# Patient Record
Sex: MALE | Race: WHITE
[De-identification: names, ages, dates, MRNs, and addresses within clinical notes are randomized per-mention and may not be internally consistent; named-entity substitution may affect disease eponyms.]

---

## 2017-01-15 ENCOUNTER — HOSPITAL ENCOUNTER (EMERGENCY)
Dept: HOSPITAL 53 - M ED | Age: 47
Discharge: HOME | End: 2017-01-15
Payer: COMMERCIAL

## 2017-01-15 DIAGNOSIS — Z79.899: ICD-10-CM
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
## 2017-01-15 DIAGNOSIS — S61.511A: Primary | ICD-10-CM
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
## 2017-01-15 NOTE — EDDOCDS
Physician Documentation                                                                           

[facility]                                                                         

Name: Gerald Aaron                                                                              

Age: 46 yrs                                                                                       

Sex: Male                                                                                         

: 1970                                                                                   

MRN: [id number]                                                                                     

Arrival Date: 01/15/2017                                                                          

Time: 11:49                                                                                       

Account#: [id number]                                                                              

Bed PD                                                                                      

Private MD: Ulices Villa A.                                                                      

Disposition:                                                                                      

01/15/17 14:38 Discharged to Home/Self Care. Impression: Laceration without foreign body of       

right wrist.                                                                                    

- Condition is Stable.                                                                            

- Discharge Instructions: Wound Check.                                                            

- Prescriptions for naproxen 500 mg Oral tablet - take 1 tablet by ORAL route every 12            

hours; 28 tablet. Tylenol 325 mg Oral Tablet - take 2 tablet by ORAL route every 6              

hours as needed; 1 bottle.                                                                      

- Medication Reconciliation, Local Pharmacy Hours form.                                           

- Follow up: Emergency Department; When: As soon as possible; Reason: Worsening of                

conditions. Follow up: Private Physician; When: 2 - 3 days; Reason: Recheck today's             

complaints.                                                                                     

- Problem is new.                                                                                 

- Symptoms have improved.                                                                         

                                                                                                  

                                                                                                  

                                                                                                  

Historical:                                                                                       

- Allergies: no known allergies;                                                                  

- Home Meds:                                                                                      

1. Wellbutrin  mg Oral Tb24 1 tab once daily                                              

2. Motrin 800 mg Oral tab 1 tab 3 times per day                                                 

3. Ritalin 20 mg Oral tab daily                                                                 

- PSHx: Hernia repair; left knee surgery;                                                         

- Immunization history:: Last tetanus immunization: unknown.                                      

- Family history: Not pertinent.                                                                  

- Social history: Smoking status: Patient states was never smoker of tobacco. No                  

barriers to communication noted, The patient speaks fluent English.                             

- : The pt / caregiver states he / she is not on anticoagulants. Home medication list             

is obtained from the patient.                                                                   

- Exposure Risk Screening:: None identified.                                                      

                                                                                                  

                                                                                                  

Vital Signs:                                                                                      

01/15                                                                                             

11:53  / 112; Pulse 100; Resp 18; Temp 98.0; Pulse Ox 100% ; Weight 86.18 kg / 189.99   elp 

      lbs; Height 5 ft. 8 in. (172.72 cm); Pain 5/10;                                             

14:45  / 92 LA Sitting (auto/lg); Pulse 79; Resp 18; Temp 98.2(T); Pulse Ox 99% on R/A; ar3 

      Pain 4/10;                                                                                  

11:53 Body Mass Index 28.89 (86.18 kg, 172.72 cm)                                             elp 

                                                                                                  

MDM:                                                                                              

13:16 Wound Care ordered.                                                                     jk8 

13:34 Financial registration complete.                                                        mm15

14:13 Lidocaine 20 mg/mL (2 %) 10 ml Infiltration once; to bedside ordered.                   jk8 

14:14 Vital Signs ordered.                                                                    jk8 

14:19 [facility] Payment Agreement was scanned into [employer] and attached to record.               mm15

                                                                                                  

Administered Medications:                                                                         

14:30 Drug: Lidocaine 10 ml [lidocaine 20 mg/mL (2 %) injection solution (10 mL)] {Note:      mcp 

      given by JACKIE Rudolph} Route: Infiltration;                                              

                                                                                                  

                                                                                                  

Signatures:                                                                                       

Era Walton RN                        RN   Angie Galan                             mm15                                                 

Ollie Lau PA-C PA-C jk8                                                  

                                                                                                  

The chart was reviewed and I authenticate all verbal orders and agree with the evaluation and 
treatment provided.Attachments:

14:19 [facility] Payment Agreement                                                                mm15

                                                                                                  

**************************************************************************************************

MTDD

## 2017-01-15 NOTE — EDDOCDS
Nurse's Notes                                                                                     

[facility]                                                                         

Name: Gerald Aaorn                                                                              

Age: 46 yrs                                                                                       

Sex: Male                                                                                         

: 1970                                                                                   

MRN: [id number]                                                                                     

Arrival Date: 01/15/2017                                                                          

Time: 11:49                                                                                       

Account#: [id number]                                                                              

Bed PD                                                                                      

Private MD: Ulices Villa A.                                                                      

Diagnosis: Laceration without foreign body of right wrist                                         

                                                                                                  

Presentation:                                                                                     

01/15                                                                                             

11:57 Presenting complaint: Patient states: Cut right wrist with utility knife. Adult Sepsis  [facility] 

      Screening: The patient does not have new or worsening altered mentation. Patient's          

      respiratory rate is less than 22. Systolic blood pressure is greater than 100. Patient      

      has a qSOFA score of 0- Negative Sepsis Screen. Suicide/Homicide risk assessment- the       

      patient denies having any suicidal and/or homicidal ideations and does not present with     

      any other emotional, behavioral or mental health complaints.  Status: Patient       

      is not a  or  dependent. Transition of care: patient was not          

      received from another setting of care.                                                      

11:57 Acuity: PURVI Level 4                                                                     [facility] 

11:57 Method Of Arrival: Walkin/Carried/Asstd                                                 [facility] 

                                                                                                  

Triage Assessment:                                                                                

11:59 General: Appears in no apparent distress, Behavior is cooperative. Pain: Location:      mcp 

      right wrist Pain currently is 5 out of 10 on a pain scale. HIV screening NA for this        

      visit Offered previously. Neurological: No deficits noted. Respiratory: No deficits         

      noted. Derm: Skin is pink, warm & dry. Injury Description: Laceration sustained to        

      right wrist is gauze dressing intact.                                                       

                                                                                                  

Historical:                                                                                       

- Allergies: no known allergies;                                                                  

- Home Meds:                                                                                      

1. Wellbutrin  mg Oral Tb24 1 tab once daily                                              

2. Motrin 800 mg Oral tab 1 tab 3 times per day                                                 

3. Ritalin 20 mg Oral tab daily                                                                 

- PSHx: Hernia repair; left knee surgery;                                                         

- Immunization history:: Last tetanus immunization: unknown.                                      

- Family history: Not pertinent.                                                                  

- Social history: Smoking status: Patient states was never smoker of tobacco. No                  

barriers to communication noted, The patient speaks fluent English.                             

- : The pt / caregiver states he / she is not on anticoagulants. Home medication list             

is obtained from the patient.                                                                   

- Exposure Risk Screening:: None identified.                                                      

                                                                                                  

                                                                                                  

Screenin:49 Screening information is obtained from the patient. Fall risk: No risks identified.     mcp 

      Assistance ADL's: requires no assistance with activities of daily living. Abuse/DV          

      Screen: The patient / caregiver reports he/she is: not in a situation that causes fear,     

      pain or injury. Nutritional screening: No deficits noted. Advance Directives:               

      Currently, there is no health care proxy. There is no active DNR order. There is no         

      Power of . home support is adequate.                                                

                                                                                                  

Assessment:                                                                                       

14:48 General: Appears in no apparent distress, Behavior is cooperative. Neurological: No     mcp 

      deficits noted. Respiratory: Airway is patent Respiratory effort is even, unlabored.        

      Derm: Skin is pink, warm & dry. Injury Description: Laceration sustained to right wrist   

      is clean, 0.5 to 2.5 cm long, bleeding moderately.                                          

                                                                                                  

Vital Signs:                                                                                      

11:53  / 112; Pulse 100; Resp 18; Temp 98.0; Pulse Ox 100% ; Weight 86.18 kg; Height 5  elp 

      ft. 8 in. (172.72 cm); Pain 5/10;                                                           

14:45  / 92 LA Sitting (auto/lg); Pulse 79; Resp 18; Temp 98.2(T); Pulse Ox 99% on R/A; ar3 

      Pain 4/10;                                                                                  

11:53 Body Mass Index 28.89 (86.18 kg, 172.72 cm)                                             elp 

                                                                                                  

Vitals:                                                                                           

11:53 Log In Time: January 15, 2017 at 11:52.                                                 elp 

                                                                                                  

ED Course:                                                                                        

11:52 Patient visited by Mai Mccray PCA.                                                  elp 

11:52 Ulices Villa is Private Physician.                                                      elp 

11:52 Patient moved to Waiting                                                                elp 

11:53 Patient visited by Mai Mccray PCA.                                                  elp 

11:53 Patient moved to Pre RCE                                                                elp 

11:57 Triage Initiated                                                                        mcp 

12:00 Patient visited by Era Walton RN.                                                    mcp 

12:34 Patient moved to Triage 1                                                               ar3 

13:15 [facility]                                                          jk8 

13:15 Lars Gonzalez MD is Attending Physician.                                               jk8 

13:15 Patient visited by Ollie Lau PA-C.                                               jk8 

13:16 Patient moved to PD2 /                                                                ar3 

13:23 Patient visited by Era Walton RN.                                                    mcp 

13:23 Wound care to laceration located on right wrist was soaked in sterile water Patient     mcp 

      tolerated well.                                                                             

14:19 [facility] Payment Agreement was scanned into [employer] and attached to record.               mm15

14:46 Patient visited by Kimberley Castro PCA.                                                  ar3 

14:49 The patient / caregiver is instructed regarding the plan of care and ED course. Patient mcp 

      has correct armband on for positive identification. Bed in low position. Call light in      

      reach. Adult w/ patient.                                                                    

14:50 No IV's were initiated during this patient's visit. No procedures done that require     mcp 

      assistance.                                                                                 

                                                                                                  

Administered Medications:                                                                         

14:30 Drug: Lidocaine 10 ml [lidocaine 20 mg/mL (2 %) injection solution (10 mL)] {Note:      mcp 

      given by JACKIE Rudolph} Route: Infiltration;                                              

                                                                                                  

                                                                                                  

Order Results:                                                                                    

There are currently no results for this order.                                                    

Outcome:                                                                                          

14:38 Discharge ordered by Provider.                                                          jk8 

14:49 Discharge Assessment: patient administered narcotics - no. The following High Risk      [facility] 

      Discharge criteria are identified: None. Discharged to home ambulatory, with                

      significant other. Condition: stable. Discharge instructions given to patient,              

      Instructed on discharge instructions, follow up and referral plans. medication usage,       

      wound care, Demonstrated understanding of instructions, medications, Pt was receptive       

      of discharge instructions/ teaching. Prescriptions given X 2. No special radiology          

      studies were completed. Property sent home with patient.                                    

14:50 Patient left the ED.                                                                    [facility] 

                                                                                                  

Signatures:                                                                                       

Era Walton RN                        RN   [facility]                                                  

Kimberley Castro, JANE                      PCA  ar3                                                  

Angie Mejia                             mm15                                                 

Mai Mccray, PCA                      PCA  Ollie Nj PA-C PA-C jk8                                                  

                                                                                                  

**************************************************************************************************

MTDD

## 2017-01-17 NOTE — EDDOCDS
Nurse's Notes                                                                                     

[facility]                                                                         

Name: Gerald Aaron                                                                              

Age: 46 yrs                                                                                       

Sex: Male                                                                                         

: 1970                                                                                   

MRN: [id number]                                                                                     

Arrival Date: 01/15/2017                                                                          

Time: 11:49                                                                                       

Account#: [id number]                                                                              

Bed PD                                                                                      

Private MD: Ulices Villa A.                                                                      

Diagnosis: Laceration without foreign body of right wrist                                         

                                                                                                  

Presentation:                                                                                     

01/15                                                                                             

11:57 Presenting complaint: Patient states: Cut right wrist with utility knife. Adult Sepsis  [facility] 

      Screening: The patient does not have new or worsening altered mentation. Patient's          

      respiratory rate is less than 22. Systolic blood pressure is greater than 100. Patient      

      has a qSOFA score of 0- Negative Sepsis Screen. Suicide/Homicide risk assessment- the       

      patient denies having any suicidal and/or homicidal ideations and does not present with     

      any other emotional, behavioral or mental health complaints.  Status: Patient       

      is not a  or  dependent. Transition of care: patient was not          

      received from another setting of care.                                                      

11:57 Acuity: PURVI Level 4                                                                     [facility] 

11:57 Method Of Arrival: Walkin/Carried/Asstd                                                 [facility] 

                                                                                                  

Triage Assessment:                                                                                

11:59 General: Appears in no apparent distress, Behavior is cooperative. Pain: Location:      mcp 

      right wrist Pain currently is 5 out of 10 on a pain scale. HIV screening NA for this        

      visit Offered previously. Neurological: No deficits noted. Respiratory: No deficits         

      noted. Derm: Skin is pink, warm & dry. Injury Description: Laceration sustained to        

      right wrist is gauze dressing intact.                                                       

                                                                                                  

Historical:                                                                                       

- Allergies: no known allergies;                                                                  

- Home Meds:                                                                                      

1. Wellbutrin  mg Oral Tb24 1 tab once daily                                              

2. Motrin 800 mg Oral tab 1 tab 3 times per day                                                 

3. Ritalin 20 mg Oral tab daily                                                                 

- PSHx: Hernia repair; left knee surgery;                                                         

- Immunization history:: Last tetanus immunization: unknown.                                      

- Family history: Not pertinent.                                                                  

- Social history: Smoking status: Patient states was never smoker of tobacco. No                  

barriers to communication noted, The patient speaks fluent English.                             

- : The pt / caregiver states he / she is not on anticoagulants. Home medication list             

is obtained from the patient.                                                                   

- Exposure Risk Screening:: None identified.                                                      

                                                                                                  

                                                                                                  

Screenin:49 Screening information is obtained from the patient. Fall risk: No risks identified.     mcp 

      Assistance ADL's: requires no assistance with activities of daily living. Abuse/DV          

      Screen: The patient / caregiver reports he/she is: not in a situation that causes fear,     

      pain or injury. Nutritional screening: No deficits noted. Advance Directives:               

      Currently, there is no health care proxy. There is no active DNR order. There is no         

      Power of . home support is adequate.                                                

                                                                                                  

Assessment:                                                                                       

14:48 General: Appears in no apparent distress, Behavior is cooperative. Neurological: No     mcp 

      deficits noted. Respiratory: Airway is patent Respiratory effort is even, unlabored.        

      Derm: Skin is pink, warm & dry. Injury Description: Laceration sustained to right wrist   

      is clean, 0.5 to 2.5 cm long, bleeding moderately.                                          

                                                                                                  

Vital Signs:                                                                                      

11:53  / 112; Pulse 100; Resp 18; Temp 98.0; Pulse Ox 100% ; Weight 86.18 kg; Height 5  elp 

      ft. 8 in. (172.72 cm); Pain 5/10;                                                           

14:45  / 92 LA Sitting (auto/lg); Pulse 79; Resp 18; Temp 98.2(T); Pulse Ox 99% on R/A; ar3 

      Pain 4/10;                                                                                  

11:53 Body Mass Index 28.89 (86.18 kg, 172.72 cm)                                             elp 

                                                                                                  

Vitals:                                                                                           

11:53 Log In Time: January 15, 2017 at 11:52.                                                 elp 

                                                                                                  

ED Course:                                                                                        

11:52 Patient visited by Mai Mccray PCA.                                                  elp 

11:52 Ulices Villa is Private Physician.                                                      elp 

11:52 Patient moved to Waiting                                                                elp 

11:53 Patient visited by Mai Mccray PCA.                                                  elp 

11:53 Patient moved to Pre RCE                                                                elp 

11:57 Triage Initiated                                                                        mcp 

12:00 Patient visited by Era Walton RN.                                                    mcp 

12:34 Patient moved to Triage 1                                                               ar3 

13:15 [facility]                                                          jk8 

13:15 Lars Gonzalez MD is Attending Physician.                                               jk8 

13:15 Patient visited by Ollie Lau PA-C.                                               jk8 

13:16 Patient moved to PD2 /                                                                ar3 

13:23 Patient visited by Era Walton RN.                                                    mcp 

13:23 Wound care to laceration located on right wrist was soaked in sterile water Patient     mcp 

      tolerated well.                                                                             

14:19 [facility] Payment Agreement was scanned into [employer]-View and attached to record.               mm15

14:46 Patient visited by Kimberley Castro, JANE.                                                  ar3 

14:49 The patient / caregiver is instructed regarding the plan of care and ED course. Patient mcp 

      has correct armband on for positive identification. Bed in low position. Call light in      

      reach. Adult w/ patient.                                                                    

14:50 No IV's were initiated during this patient's visit. No procedures done that require     mcp 

      assistance.                                                                                 

20:59 T-Sheet-- Draft Copy was scanned into GI-View and attached to record.                   klr 

                                                                                                  

Administered Medications:                                                                         

14:30 Drug: Lidocaine 10 ml [lidocaine 20 mg/mL (2 %) injection solution (10 mL)] {Note:      mcp 

      given by JACKIE Rudolph} Route: Infiltration;                                              

                                                                                                  

                                                                                                  

Order Results:                                                                                    

There are currently no results for this order.                                                    

Outcome:                                                                                          

14:38 Discharge ordered by Provider.                                                          jk8 

14:49 Discharge Assessment: patient administered narcotics - no. The following High Risk      [facility] 

      Discharge criteria are identified: None. Discharged to home ambulatory, with                

      significant other. Condition: stable. Discharge instructions given to patient,              

      Instructed on discharge instructions, follow up and referral plans. medication usage,       

      wound care, Demonstrated understanding of instructions, medications, Pt was receptive       

      of discharge instructions/ teaching. Prescriptions given X 2. No special radiology          

      studies were completed. Property sent home with patient.                                    

14:50 Patient left the ED.                                                                    [facility] 

                                                                                                  

Signatures:                                                                                       

Era Walton RN                        RN   [facility]                                                  

GloriaKimberley, PCA                      PCA  ar3                                                  

Angie Mejia                             mm15                                                 

Mai Mccray, PCA                      PCA  Ollie Nj PA-C PA-C jk8 Redder, Kathie klr                                                  

                                                                                                  

**************************************************************************************************



*** Chart Complete ***
MTDD

## 2017-01-17 NOTE — EDDOCDS
Physician Documentation                                                                           

[facility]                                                                         

Name: Gerald Aaron                                                                              

Age: 46 yrs                                                                                       

Sex: Male                                                                                         

: 1970                                                                                   

MRN: [id number]                                                                                     

Arrival Date: 01/15/2017                                                                          

Time: 11:49                                                                                       

Account#: [id number]                                                                              

Bed PD                                                                                      

Private MD: Ulices Villa A.                                                                      

Disposition:                                                                                      

01/15/17 14:38 Discharged to Home/Self Care. Impression: Laceration without foreign body of       

right wrist.                                                                                    

- Condition is Stable.                                                                            

- Discharge Instructions: Wound Check.                                                            

- Prescriptions for naproxen 500 mg Oral tablet - take 1 tablet by ORAL route every 12            

hours; 28 tablet. Tylenol 325 mg Oral Tablet - take 2 tablet by ORAL route every 6              

hours as needed; 1 bottle.                                                                      

- Medication Reconciliation, Local Pharmacy Hours form.                                           

- Follow up: Emergency Department; When: As soon as possible; Reason: Worsening of                

conditions. Follow up: Private Physician; When: 2 - 3 days; Reason: Recheck today's             

complaints.                                                                                     

- Problem is new.                                                                                 

- Symptoms have improved.                                                                         

                                                                                                  

                                                                                                  

                                                                                                  

Historical:                                                                                       

- Allergies: no known allergies;                                                                  

- Home Meds:                                                                                      

1. Wellbutrin  mg Oral Tb24 1 tab once daily                                              

2. Motrin 800 mg Oral tab 1 tab 3 times per day                                                 

3. Ritalin 20 mg Oral tab daily                                                                 

- PSHx: Hernia repair; left knee surgery;                                                         

- Immunization history:: Last tetanus immunization: unknown.                                      

- Family history: Not pertinent.                                                                  

- Social history: Smoking status: Patient states was never smoker of tobacco. No                  

barriers to communication noted, The patient speaks fluent English.                             

- : The pt / caregiver states he / she is not on anticoagulants. Home medication list             

is obtained from the patient.                                                                   

- Exposure Risk Screening:: None identified.                                                      

                                                                                                  

                                                                                                  

Vital Signs:                                                                                      

01/15                                                                                             

11:53  / 112; Pulse 100; Resp 18; Temp 98.0; Pulse Ox 100% ; Weight 86.18 kg / 189.99   elp 

      lbs; Height 5 ft. 8 in. (172.72 cm); Pain 5/10;                                             

14:45  / 92 LA Sitting (auto/lg); Pulse 79; Resp 18; Temp 98.2(T); Pulse Ox 99% on R/A; ar3 

      Pain 4/10;                                                                                  

11:53 Body Mass Index 28.89 (86.18 kg, 172.72 cm)                                             elp 

                                                                                                  

MDM:                                                                                              

13:16 Wound Care ordered.                                                                     jk8 

13:34 Financial registration complete.                                                        mm15

14:13 Lidocaine 20 mg/mL (2 %) 10 ml Infiltration once; to bedside ordered.                   jk8 

14:14 Vital Signs ordered.                                                                    jk8 

14:19 [facility] Payment Agreement was scanned into [employer] and attached to record.               mm15

20:59 T-Sheet-- Draft Copy was scanned into [employer] and attached to record.                   klr 

                                                                                                  

Administered Medications:                                                                         

14:30 Drug: Lidocaine 10 ml [lidocaine 20 mg/mL (2 %) injection solution (10 mL)] {Note:      mcp 

      given by JACKIE Rudolph} Route: Infiltration;                                              

                                                                                                  

                                                                                                  

Signatures:                                                                                       

Era Walton RN                        RN   mcp                                                  

Angie Mejia                             mm15                                                 

Ollie Lau PA-C PA-C jk8 Redder, Kathie klr                                                  

                                                                                                  

The chart was reviewed and I authenticate all verbal orders and agree with the evaluation and 
treatment provided.Attachments:

14:19 [facility] Payment Agreement                                                                mm15

20:59 T-Sheet-- Draft Copy                                                                    klr 

                                                                                                  

**************************************************************************************************



*** Chart Complete ***
MTDD

## 2017-01-17 NOTE — EDDOCDS
Physician Documentation                                                                           

[facility]                                                                         

Name: Gerald Aaron                                                                              

Age: 46 yrs                                                                                       

Sex: Male                                                                                         

: 1970                                                                                   

MRN: [id number]                                                                                     

Arrival Date: 01/15/2017                                                                          

Time: 11:49                                                                                       

Account#: [id number]                                                                              

Bed PD                                                                                      

Private MD: Ulices Villa A.                                                                      

Disposition:                                                                                      

01/15/17 14:38 Discharged to Home/Self Care. Impression: Laceration without foreign body of       

right wrist.                                                                                    

- Condition is Stable.                                                                            

- Discharge Instructions: Wound Check.                                                            

- Prescriptions for naproxen 500 mg Oral tablet - take 1 tablet by ORAL route every 12            

hours; 28 tablet. Tylenol 325 mg Oral Tablet - take 2 tablet by ORAL route every 6              

hours as needed; 1 bottle.                                                                      

- Medication Reconciliation, Local Pharmacy Hours form.                                           

- Follow up: Emergency Department; When: As soon as possible; Reason: Worsening of                

conditions. Follow up: Private Physician; When: 2 - 3 days; Reason: Recheck today's             

complaints.                                                                                     

- Problem is new.                                                                                 

- Symptoms have improved.                                                                         

                                                                                                  

                                                                                                  

                                                                                                  

Historical:                                                                                       

- Allergies: no known allergies;                                                                  

- Home Meds:                                                                                      

1. Wellbutrin  mg Oral Tb24 1 tab once daily                                              

2. Motrin 800 mg Oral tab 1 tab 3 times per day                                                 

3. Ritalin 20 mg Oral tab daily                                                                 

- PSHx: Hernia repair; left knee surgery;                                                         

- Immunization history:: Last tetanus immunization: unknown.                                      

- Family history: Not pertinent.                                                                  

- Social history: Smoking status: Patient states was never smoker of tobacco. No                  

barriers to communication noted, The patient speaks fluent English.                             

- : The pt / caregiver states he / she is not on anticoagulants. Home medication list             

is obtained from the patient.                                                                   

- Exposure Risk Screening:: None identified.                                                      

                                                                                                  

                                                                                                  

Vital Signs:                                                                                      

01/15                                                                                             

11:53  / 112; Pulse 100; Resp 18; Temp 98.0; Pulse Ox 100% ; Weight 86.18 kg / 189.99   elp 

      lbs; Height 5 ft. 8 in. (172.72 cm); Pain 5/10;                                             

14:45  / 92 LA Sitting (auto/lg); Pulse 79; Resp 18; Temp 98.2(T); Pulse Ox 99% on R/A; ar3 

      Pain 4/10;                                                                                  

11:53 Body Mass Index 28.89 (86.18 kg, 172.72 cm)                                             elp 

                                                                                                  

MDM:                                                                                              

13:16 Wound Care ordered.                                                                     jk8 

13:34 Financial registration complete.                                                        mm15

14:13 Lidocaine 20 mg/mL (2 %) 10 ml Infiltration once; to bedside ordered.                   jk8 

14:14 Vital Signs ordered.                                                                    jk8 

14:19 [facility] Payment Agreement was scanned into [employer] and attached to record.               mm15

20:59 T-Sheet-- Draft Copy was scanned into [employer] and attached to record.                   klr 

                                                                                                  

Administered Medications:                                                                         

14:30 Drug: Lidocaine 10 ml [lidocaine 20 mg/mL (2 %) injection solution (10 mL)] {Note:      mcp 

      given by JACKIE Rudolph} Route: Infiltration;                                              

                                                                                                  

                                                                                                  

Signatures:                                                                                       

Era Walton RN                        RN   mcp                                                  

Angie Mejia                             mm15                                                 

Ollie Lau PA-C PA-C [id number] Redder, Kathie klr                                                  

                                                                                                  

The chart was reviewed and I authenticate all verbal orders and agree with the evaluation and 
treatment provided.Attachments:

14:19 [facility] Payment Agreement                                                                mm15

20:59 T-Sheet-- Draft Copy                                                                    klr 

                                                                                                  

**************************************************************************************************



*** Chart Complete ***
MTDD

## 2023-02-27 ENCOUNTER — HOSPITAL ENCOUNTER (OUTPATIENT)
Dept: HOSPITAL 53 - M WUC | Age: 53
End: 2023-02-27
Attending: STUDENT IN AN ORGANIZED HEALTH CARE EDUCATION/TRAINING PROGRAM
Payer: COMMERCIAL

## 2023-02-27 DIAGNOSIS — M25.511: Primary | ICD-10-CM
